# Patient Record
Sex: FEMALE
[De-identification: names, ages, dates, MRNs, and addresses within clinical notes are randomized per-mention and may not be internally consistent; named-entity substitution may affect disease eponyms.]

---

## 2023-04-27 PROBLEM — Z00.00 ENCOUNTER FOR PREVENTIVE HEALTH EXAMINATION: Status: ACTIVE | Noted: 2023-04-27

## 2023-09-27 ENCOUNTER — APPOINTMENT (OUTPATIENT)
Dept: PLASTIC SURGERY | Facility: CLINIC | Age: 33
End: 2023-09-27
Payer: COMMERCIAL

## 2023-09-27 VITALS
HEART RATE: 91 BPM | HEIGHT: 72 IN | TEMPERATURE: 97.3 F | BODY MASS INDEX: 33.86 KG/M2 | DIASTOLIC BLOOD PRESSURE: 89 MMHG | WEIGHT: 250 LBS | SYSTOLIC BLOOD PRESSURE: 136 MMHG

## 2023-09-27 DIAGNOSIS — B36.0 PITYRIASIS VERSICOLOR: ICD-10-CM

## 2023-09-27 PROCEDURE — 99204 OFFICE O/P NEW MOD 45 MIN: CPT

## 2023-09-29 ENCOUNTER — APPOINTMENT (OUTPATIENT)
Dept: PLASTIC SURGERY | Facility: CLINIC | Age: 33
End: 2023-09-29

## 2023-09-29 PROBLEM — B36.0 TINEA VERSICOLOR: Status: ACTIVE | Noted: 2023-09-29

## 2023-09-29 RX ORDER — ESTRADIOL 10 UG/1
TABLET, FILM COATED VAGINAL
Refills: 0 | Status: ACTIVE | COMMUNITY

## 2023-09-29 RX ORDER — PROGESTERONE 200 MG/1
CAPSULE ORAL
Refills: 0 | Status: ACTIVE | COMMUNITY

## 2023-10-11 ENCOUNTER — TRANSCRIPTION ENCOUNTER (OUTPATIENT)
Age: 33
End: 2023-10-11

## 2023-10-13 ENCOUNTER — APPOINTMENT (OUTPATIENT)
Dept: PLASTIC SURGERY | Facility: CLINIC | Age: 33
End: 2023-10-13
Payer: COMMERCIAL

## 2023-10-13 VITALS
DIASTOLIC BLOOD PRESSURE: 81 MMHG | WEIGHT: 250 LBS | TEMPERATURE: 98 F | SYSTOLIC BLOOD PRESSURE: 131 MMHG | HEIGHT: 72 IN | HEART RATE: 78 BPM | BODY MASS INDEX: 33.86 KG/M2 | OXYGEN SATURATION: 100 %

## 2023-10-13 DIAGNOSIS — F64.0 TRANSSEXUALISM: ICD-10-CM

## 2023-10-13 PROCEDURE — 99214 OFFICE O/P EST MOD 30 MIN: CPT

## 2023-10-14 PROBLEM — F64.0 GENDER DYSPHORIA IN ADULT: Status: ACTIVE | Noted: 2023-09-29

## 2024-06-07 ENCOUNTER — APPOINTMENT (OUTPATIENT)
Dept: CT IMAGING | Facility: CLINIC | Age: 34
End: 2024-06-07
Payer: MEDICAID

## 2024-06-07 ENCOUNTER — OUTPATIENT (OUTPATIENT)
Dept: OUTPATIENT SERVICES | Facility: HOSPITAL | Age: 34
LOS: 1 days | End: 2024-06-07

## 2024-06-07 PROCEDURE — 70486 CT MAXILLOFACIAL W/O DYE: CPT | Mod: 26

## 2024-10-25 ENCOUNTER — APPOINTMENT (OUTPATIENT)
Dept: PLASTIC SURGERY | Facility: CLINIC | Age: 34
End: 2024-10-25
Payer: COMMERCIAL

## 2024-10-25 VITALS
WEIGHT: 262 LBS | HEIGHT: 72 IN | TEMPERATURE: 98 F | SYSTOLIC BLOOD PRESSURE: 123 MMHG | HEART RATE: 72 BPM | BODY MASS INDEX: 35.49 KG/M2 | OXYGEN SATURATION: 98 % | DIASTOLIC BLOOD PRESSURE: 86 MMHG

## 2024-10-25 DIAGNOSIS — F64.0 TRANSSEXUALISM: ICD-10-CM

## 2024-10-25 PROCEDURE — 99215 OFFICE O/P EST HI 40 MIN: CPT

## 2024-10-30 VITALS
WEIGHT: 277.78 LBS | HEART RATE: 73 BPM | OXYGEN SATURATION: 98 % | RESPIRATION RATE: 16 BRPM | HEIGHT: 72 IN | SYSTOLIC BLOOD PRESSURE: 122 MMHG | TEMPERATURE: 98 F | DIASTOLIC BLOOD PRESSURE: 81 MMHG

## 2024-10-30 RX ORDER — ESTRADIOL 1.25 MG/1.25G
0.3 GEL TOPICAL
Refills: 0 | DISCHARGE

## 2024-10-31 ENCOUNTER — TRANSCRIPTION ENCOUNTER (OUTPATIENT)
Age: 34
End: 2024-10-31

## 2024-10-31 ENCOUNTER — INPATIENT (INPATIENT)
Facility: HOSPITAL | Age: 34
LOS: 1 days | Discharge: HOME | End: 2024-11-02
Attending: SURGERY | Admitting: SURGERY
Payer: MEDICAID

## 2024-10-31 ENCOUNTER — APPOINTMENT (OUTPATIENT)
Dept: PLASTIC SURGERY | Facility: HOSPITAL | Age: 34
End: 2024-10-31

## 2024-10-31 DIAGNOSIS — Z98.890 OTHER SPECIFIED POSTPROCEDURAL STATES: Chronic | ICD-10-CM

## 2024-10-31 DIAGNOSIS — F64.0 TRANSSEXUALISM: ICD-10-CM

## 2024-10-31 DIAGNOSIS — Z41.9 ENCOUNTER FOR PROCEDURE FOR PURPOSES OTHER THAN REMEDYING HEALTH STATE, UNSPECIFIED: Chronic | ICD-10-CM

## 2024-10-31 PROCEDURE — 21256 RECONSTRUCTION OF ORBIT: CPT

## 2024-10-31 PROCEDURE — 15824 RHYTIDECTOMY FOREHEAD: CPT

## 2024-10-31 PROCEDURE — 30420 RECONSTRUCTION OF NOSE: CPT

## 2024-10-31 PROCEDURE — 21209 REDUCTION OF FACIAL BONES: CPT

## 2024-10-31 PROCEDURE — 21139 RDCTJ FOREHEAD CNTRG&SETBACK: CPT

## 2024-10-31 PROCEDURE — 40799 UNLISTED PROCEDURE LIPS: CPT

## 2024-10-31 PROCEDURE — 20912 REMOVE CARTILAGE FOR GRAFT: CPT

## 2024-10-31 PROCEDURE — 21122 GENIOP SLDG OSTEOT 2/>: CPT

## 2024-10-31 PROCEDURE — 31899 UNLISTED PX TRACHEA BRONCHI: CPT

## 2024-10-31 PROCEDURE — 21270 AUGMENTATION CHEEK BONE: CPT | Mod: 50

## 2024-10-31 DEVICE — IMP IPS MAND/ORTHOG LP TI-6AL-4V 67MM: Type: IMPLANTABLE DEVICE | Status: FUNCTIONAL

## 2024-10-31 DEVICE — SCREW MAXDRIVE 1 LVL 2X7MM: Type: IMPLANTABLE DEVICE | Status: FUNCTIONAL

## 2024-10-31 DEVICE — SCREW 2.0X13MM: Type: IMPLANTABLE DEVICE | Status: FUNCTIONAL

## 2024-10-31 DEVICE — SCREW CR DRV 2.0X11MM: Type: IMPLANTABLE DEVICE | Status: FUNCTIONAL

## 2024-10-31 DEVICE — SURGCEL 4 X 8": Type: IMPLANTABLE DEVICE | Status: FUNCTIONAL

## 2024-10-31 DEVICE — MESH RESORB RXG 51X51MM 0.6MM: Type: IMPLANTABLE DEVICE | Status: FUNCTIONAL

## 2024-10-31 DEVICE — IMP IPS MARKING GUIDE TRANSFORM LP XS: Type: IMPLANTABLE DEVICE | Status: FUNCTIONAL

## 2024-10-31 DEVICE — IMP MALAR MIDFACE PEEK LP 27MM: Type: IMPLANTABLE DEVICE | Status: FUNCTIONAL

## 2024-10-31 DEVICE — SCREW BONE RESORB 2.1X4MM SONIC PIN: Type: IMPLANTABLE DEVICE | Status: FUNCTIONAL

## 2024-10-31 DEVICE — PIN SONIC RX 2.1X4MM: Type: IMPLANTABLE DEVICE | Status: FUNCTIONAL

## 2024-10-31 DEVICE — IMP IPS MARKING GUIDE CRANIAL TRANSFORM EXTRA LP: Type: IMPLANTABLE DEVICE | Status: FUNCTIONAL

## 2024-10-31 DEVICE — IMPLANTABLE DEVICE: Type: IMPLANTABLE DEVICE | Status: FUNCTIONAL

## 2024-10-31 DEVICE — SCREW EMERG CROSS DRIVE TI 2X9: Type: IMPLANTABLE DEVICE | Status: FUNCTIONAL

## 2024-10-31 RX ORDER — PROGESTERONE 200 MG/1
200 CAPSULE ORAL
Refills: 0 | DISCHARGE

## 2024-10-31 RX ORDER — EMTRICITABINE AND TENOFOVIR DISOPROXIL FUMARATE 200; 300 MG/1; MG/1
1 TABLET, FILM COATED ORAL
Refills: 0 | DISCHARGE

## 2024-10-31 RX ORDER — GABAPENTIN 300 MG/1
300 CAPSULE ORAL EVERY 12 HOURS
Refills: 0 | Status: DISCONTINUED | OUTPATIENT
Start: 2024-10-31 | End: 2024-11-02

## 2024-10-31 RX ORDER — ONDANSETRON HYDROCHLORIDE 2 MG/ML
4 INJECTION, SOLUTION INTRAMUSCULAR; INTRAVENOUS ONCE
Refills: 0 | Status: DISCONTINUED | OUTPATIENT
Start: 2024-10-31 | End: 2024-11-02

## 2024-10-31 RX ORDER — ACETAMINOPHEN 500 MG
1000 TABLET ORAL EVERY 6 HOURS
Refills: 0 | Status: COMPLETED | OUTPATIENT
Start: 2024-10-31 | End: 2024-10-31

## 2024-10-31 RX ORDER — DEXAMETHASONE 1.5 MG 1.5 MG/1
8 TABLET ORAL EVERY 12 HOURS
Refills: 0 | Status: DISCONTINUED | OUTPATIENT
Start: 2024-10-31 | End: 2024-11-02

## 2024-10-31 RX ORDER — IBUPROFEN 200 MG
600 TABLET ORAL EVERY 6 HOURS
Refills: 0 | Status: DISCONTINUED | OUTPATIENT
Start: 2024-10-31 | End: 2024-11-02

## 2024-10-31 RX ORDER — ACETAMINOPHEN 500 MG
975 TABLET ORAL EVERY 6 HOURS
Refills: 0 | Status: DISCONTINUED | OUTPATIENT
Start: 2024-10-31 | End: 2024-10-31

## 2024-10-31 RX ORDER — ACETAMINOPHEN 500 MG
1000 TABLET ORAL EVERY 6 HOURS
Refills: 0 | Status: COMPLETED | OUTPATIENT
Start: 2024-10-31 | End: 2024-11-01

## 2024-10-31 RX ORDER — FERROUS SULFATE 325(65) MG
325 TABLET ORAL
Refills: 0 | DISCHARGE

## 2024-10-31 RX ORDER — CEFAZOLIN SODIUM 1 G
2000 VIAL (EA) INJECTION EVERY 8 HOURS
Refills: 0 | Status: DISCONTINUED | OUTPATIENT
Start: 2024-10-31 | End: 2024-11-02

## 2024-10-31 RX ORDER — CHLORHEXIDINE GLUCONATE 40 MG/ML
15 SOLUTION TOPICAL
Refills: 0 | Status: DISCONTINUED | OUTPATIENT
Start: 2024-10-31 | End: 2024-11-02

## 2024-10-31 RX ADMIN — Medication 100 MILLIGRAM(S): at 21:36

## 2024-10-31 RX ADMIN — DEXAMETHASONE 1.5 MG 101.6 MILLIGRAM(S): 1.5 TABLET ORAL at 19:23

## 2024-10-31 RX ADMIN — Medication 75 MILLILITER(S): at 18:32

## 2024-10-31 RX ADMIN — Medication 400 MILLIGRAM(S): at 18:32

## 2024-10-31 NOTE — BRIEF OPERATIVE NOTE - BRIEF OP NOTE DRAINS
Two:  1) VARGAS drain at right side of submental incisions  2) VARGAS drain at right temporal scalp at hairline incision

## 2024-10-31 NOTE — BRIEF OPERATIVE NOTE - SECOND ASSIST PARTICIPATION DETAILS - (COMPONENTS OF THE PROCEDURE THAT ASSISTANT PARTICIPATED IN)
orbital rim repair, osseous genioplasty, lip augmentation, brow contouring, rhinoplasty, malarplasty Brow closure, osseous genioplasty, mandibular angle reduction, cheek implants.

## 2024-10-31 NOTE — PRE-ANESTHESIA EVALUATION ADULT - NSANTHOSAYNRD_GEN_A_CORE
No. BRII screening performed.  STOP BANG Legend: 0-2 = LOW Risk; 3-4 = INTERMEDIATE Risk; 5-8 = HIGH Risk

## 2024-10-31 NOTE — BRIEF OPERATIVE NOTE - FIRST ASSIST PARTICIPATION DETAILS - (COMPONENTS OF THE PROCEDURE THAT ASSISTANT PARTICIPATED IN)
Frontal sinus setback, tracheal shave and platysmaplasty, browlift, malarplasty, osseous genioplasty

## 2024-10-31 NOTE — BRIEF OPERATIVE NOTE - NSICDXBRIEFPROCEDURE_GEN_ALL_CORE_FT
PROCEDURES:  Contouring, forehead, anterior frontal sinus wall, for reduction and setback 31-Oct-2024 07:36:07  Catarina Desai  Reconstruction, orbit 31-Oct-2024 07:37:48  Catarina Desai  Browlift 31-Oct-2024 12:50:15  Catarina Desai  Repair, orbital rim 31-Oct-2024 12:50:42  Catarina Desai  Genioplasty with multiple sliding osteotomies 31-Oct-2024 12:51:24  Catarina Desai  Reduction, chin 31-Oct-2024 12:51:53  Catarina Desai  Rhinoseptoplasty 31-Oct-2024 12:52:20  Catarina Desai  Nasal septum cartilage graft 31-Oct-2024 12:53:35  Catarina Desai  Release of trachea 31-Oct-2024 12:54:13  Catarina Desai  Lip lift 31-Oct-2024 12:54:40  Catarina Desai  Malarplasty 31-Oct-2024 12:55:29  Catarina Desai

## 2024-11-01 ENCOUNTER — TRANSCRIPTION ENCOUNTER (OUTPATIENT)
Age: 34
End: 2024-11-01

## 2024-11-01 RX ORDER — IBUPROFEN 200 MG
1 TABLET ORAL
Qty: 28 | Refills: 0
Start: 2024-11-01

## 2024-11-01 RX ORDER — CHLORHEXIDINE GLUCONATE 40 MG/ML
15 SOLUTION TOPICAL
Qty: 1 | Refills: 0
Start: 2024-11-01 | End: 2024-11-07

## 2024-11-01 RX ORDER — OXYCODONE HYDROCHLORIDE 30 MG/1
1 TABLET ORAL
Qty: 10 | Refills: 0
Start: 2024-11-01

## 2024-11-01 RX ORDER — GABAPENTIN 300 MG/1
1 CAPSULE ORAL
Qty: 10 | Refills: 0
Start: 2024-11-01

## 2024-11-01 RX ADMIN — Medication 400 MILLIGRAM(S): at 06:14

## 2024-11-01 RX ADMIN — Medication 75 MILLILITER(S): at 07:22

## 2024-11-01 RX ADMIN — Medication 100 MILLIGRAM(S): at 22:38

## 2024-11-01 RX ADMIN — Medication 600 MILLIGRAM(S): at 09:09

## 2024-11-01 RX ADMIN — Medication 400 MILLIGRAM(S): at 11:11

## 2024-11-01 RX ADMIN — Medication 1000 MILLIGRAM(S): at 11:26

## 2024-11-01 RX ADMIN — Medication 100 MILLIGRAM(S): at 05:35

## 2024-11-01 RX ADMIN — Medication 75 MILLILITER(S): at 18:09

## 2024-11-01 RX ADMIN — DEXAMETHASONE 1.5 MG 101.6 MILLIGRAM(S): 1.5 TABLET ORAL at 18:08

## 2024-11-01 RX ADMIN — Medication 400 MILLIGRAM(S): at 00:11

## 2024-11-01 RX ADMIN — CHLORHEXIDINE GLUCONATE 15 MILLILITER(S): 40 SOLUTION TOPICAL at 09:09

## 2024-11-01 RX ADMIN — Medication 600 MILLIGRAM(S): at 14:36

## 2024-11-01 RX ADMIN — Medication 600 MILLIGRAM(S): at 15:36

## 2024-11-01 RX ADMIN — DEXAMETHASONE 1.5 MG 101.6 MILLIGRAM(S): 1.5 TABLET ORAL at 07:22

## 2024-11-01 RX ADMIN — Medication 600 MILLIGRAM(S): at 03:03

## 2024-11-01 RX ADMIN — Medication 100 MILLIGRAM(S): at 13:10

## 2024-11-01 RX ADMIN — CHLORHEXIDINE GLUCONATE 15 MILLILITER(S): 40 SOLUTION TOPICAL at 22:32

## 2024-11-01 RX ADMIN — Medication 600 MILLIGRAM(S): at 22:32

## 2024-11-01 NOTE — DISCHARGE NOTE PROVIDER - NSDCFUADDINST_GEN_ALL_CORE_FT
MEDICATIONS:  >> A prescription for pain medication was e-prescribed to your pharmacy. Please take this as needed for severe pain.   >> Do not drive while taking the prescribed pain medication  >> Do not take the pain medication on an empty stomach, this may make you nauseous  >> Constipation is not uncommon when taking prescription pain medication. You may take an over the counter stool softener like Dulcolax or Sennakot to help with this.   >> You may take over the counter pain medication such as tylenol (acetaminophen) or Advil (ibuprofen) for pain.   >> Please use peridex rinse - swish and spit twice daily for 7 days.       ACTIVITY:  >> No bending or heavy lifting. Keep your head above the level of your heart. At your first post-op visit we will assess how you are doing and will adjust the restrictions as needed.   >> Sleep with head elevated on at least 2 pillows.     DIET:  >> Intraoral incisions: Clear liquid diet for first 48 hours then may advance to full liquid diet for 7 days. Then advance to soft diet. Avoid overly hot, cold, spicy, or acidic foods.   >> Do not drink alcohol in the immediate postoperative period and while taking prescription pain medication.     WOUND CARE:  >> Gently brush teeth and keep mouth clean after eating. At your first postop visit we will assess the wound and instruct you of any care needed.   >> Do not get your face wet, but you can wash your hair and the rest of your body. If you have a nasal splint - DO NOT GET THIS WET.   >> You may gently apply ice packs, or frozen peas to the nose and eye regions. Apply this for the first 48-72 hours. 20 minutes on and 20 minutes off. If you have a nasal splint, DO NOT apply ice to your nose.   >> Keep nasal splint on until your next postop visit, do not get this wet. Change gauze under nose as needed.  Nose will bleed over the next day or two.  This is normal.  It will be a mix of blood and mucus.  If you are noticing bleeding that does not stop or is saturating more than 2-3 gauzes per hour please call our office.   >> If you were given a facial/chin garment (Jaw Bra), please wear this 24/7 until your first postop visit.      Contact us with any questions or concerns. Please follow up with Dr. Munroe within 1 week of discharge from the hospital. You may call [phone] to schedule an appointment.

## 2024-11-01 NOTE — DISCHARGE NOTE PROVIDER - NSDCMRMEDTOKEN_GEN_ALL_CORE_FT
estradiol valerate 40 mg/mL intramuscular solution: 0.3 milliliter(s) intramuscularly once a week  ferrous sulfate: 325 milligram(s) orally 3 times a week  progesterone 200 mg oral capsule: 200 milligram(s) orally once a day  Truvada 200 mg-300 mg oral tablet: 1 tab(s) orally once a day   estradiol valerate 40 mg/mL intramuscular solution: 0.3 milliliter(s) intramuscularly once a week  ferrous sulfate: 325 milligram(s) orally 3 times a week  gabapentin 300 mg oral capsule: 1 cap(s) orally every 12 hours  ibuprofen 600 mg oral tablet: 1 tab(s) orally every 6 hours  oxyCODONE 5 mg oral tablet: 1 tab(s) orally every 6 hours as needed for  severe pain MDD: 4 tabs  Peridex 0.12% mucous membrane liquid: 15 milliliter(s) orally every 12 hours MDD: 30 mL  progesterone 200 mg oral capsule: 200 milligram(s) orally once a day  Truvada 200 mg-300 mg oral tablet: 1 tab(s) orally once a day

## 2024-11-01 NOTE — DISCHARGE NOTE PROVIDER - CARE PROVIDER_API CALL
James Munroe  Plastic Surgery  [address], [address]  [place] [address]  Phone: [phone]  Fax: [phone]  Follow Up Time:

## 2024-11-01 NOTE — DISCHARGE NOTE PROVIDER - HOSPITAL COURSE
The patient underwent a facial feminization procedure. Postoperatively the patient was sent to PACU, the patient was hemodynamically stable and sent to a surgical floor. Drains were place intraoperatively, which were removed before discharge. The patients pain was controlled by IV pain medications transitioned to PO regimen. The patient was advanced to a full liquid diet and tolerated it well. The patient was hemodynamically stable was resumed home medications, told to follow up with Dr. Munroe in 1 week and had no other issues.

## 2024-11-01 NOTE — PROVIDER CONTACT NOTE (OTHER) - ASSESSMENT
Pt is alert and oriented. 139/63, 56 bpm and 99% on room air. Denies chest pain, SOB and resting in bed. Pre-op EKG shows NSR.

## 2024-11-01 NOTE — PROVIDER CONTACT NOTE (OTHER) - SITUATION
On monitor pt is alternating between normal sinus rhythm/bradycardia and sinus arrythmia per tele tech. When in sinus arrythmia, the rate is in line with pt respirations.

## 2024-11-01 NOTE — PROGRESS NOTE ADULT - SUBJECTIVE AND OBJECTIVE BOX
STATUS POST:  Contouring, forehead, anterior frontal sinus wall, for reduction and setback    Reconstruction, orbit    Browlift    Repair, orbital rim    Genioplasty with multiple sliding osteotomies    Reduction, chin    Rhinoseptoplasty    Nasal septum cartilage graft    Release of trachea    Lip lift    Malarplasty      POST OPERATIVE DAY #1    SUBJECTIVE: Patient seen and examined at bedside by surgical team on AM rounds. Patient is doing well, no complaints overnight. Pain is controlled, no n/v.     Vital Signs Last 24 Hrs  T(C): 37.4 (01 Nov 2024 04:18), Max: 37.4 (01 Nov 2024 04:18)  T(F): 99.4 (01 Nov 2024 04:18), Max: 99.4 (01 Nov 2024 04:18)  HR: 58 (01 Nov 2024 05:05) (52 - 80)  BP: 139/63 (01 Nov 2024 04:18) (115/73 - 141/82)  BP(mean): 89 (31 Oct 2024 19:45) (89 - 104)  RR: 17 (01 Nov 2024 04:18) (16 - 20)  SpO2: 99% (01 Nov 2024 04:18) (98% - 100%)    Parameters below as of 01 Nov 2024 04:18  Patient On (Oxygen Delivery Method): room air        I&O's Summary    31 Oct 2024 07:01  -  01 Nov 2024 06:47  --------------------------------------------------------  IN: 1055 mL / OUT: 2010 mL / NET: -955 mL        Physical Exam:  General Appearance: Appears well, NAD  HEENT: Dressing and jaw bra in place, JPx2  Pulmonary: Nonlabored breathing, no respiratory distress  Extremities: WWP, SCD's in place  STATUS POST:  Contouring, forehead, anterior frontal sinus wall, for reduction and setback    Reconstruction, orbit    Browlift    Repair, orbital rim    Genioplasty with multiple sliding osteotomies    Reduction, chin    Rhinoseptoplasty    Nasal septum cartilage graft    Release of trachea    Lip lift    Malarplasty      POST OPERATIVE DAY #1    SUBJECTIVE: Patient seen and examined at bedside by surgical team on AM rounds. Overall recovering well, pain is well controlled, no n/v, vitals WNL, plan for continued monitoring, likely d/c tomorrow.     Vital Signs Last 24 Hrs  T(C): 37.4 (01 Nov 2024 04:18), Max: 37.4 (01 Nov 2024 04:18)  T(F): 99.4 (01 Nov 2024 04:18), Max: 99.4 (01 Nov 2024 04:18)  HR: 58 (01 Nov 2024 05:05) (52 - 80)  BP: 139/63 (01 Nov 2024 04:18) (115/73 - 141/82)  BP(mean): 89 (31 Oct 2024 19:45) (89 - 104)  RR: 17 (01 Nov 2024 04:18) (16 - 20)  SpO2: 99% (01 Nov 2024 04:18) (98% - 100%)    Parameters below as of 01 Nov 2024 04:18  Patient On (Oxygen Delivery Method): room air        I&O's Summary    31 Oct 2024 07:01  -  01 Nov 2024 06:47  --------------------------------------------------------  IN: 1055 mL / OUT: 2010 mL / NET: -955 mL        Physical Exam:  General Appearance: Appears well, NAD  HEENT: Dressing and jaw bra in place, JPx2  Pulmonary: Nonlabored breathing, no respiratory distress  Extremities: WWP, SCD's in place

## 2024-11-01 NOTE — DISCHARGE NOTE PROVIDER - NSDCFUSCHEDAPPT_GEN_ALL_CORE_FT
James Munroe  [facility] Physician [facility]  [facility] [address] [place] [address]  Scheduled Appointment: 11/08/2024

## 2024-11-01 NOTE — DISCHARGE NOTE PROVIDER - NSDCCPTREATMENT_GEN_ALL_CORE_FT
PRINCIPAL PROCEDURE  Procedure: Contouring, forehead, anterior frontal sinus wall, for reduction and setback  Findings and Treatment:       SECONDARY PROCEDURE  Procedure: Browlift  Findings and Treatment:     Procedure: Repair, orbital rim  Findings and Treatment:     Procedure: Genioplasty with multiple sliding osteotomies  Findings and Treatment:     Procedure: Reconstruction, orbit  Findings and Treatment:

## 2024-11-01 NOTE — PROGRESS NOTE ADULT - ASSESSMENT
34 year old PMH of gender dysphoria now POD s/1 FFS with Dr. Munroe.  34F POD 1 s/p FFS without complications, overall recovering well, pain is well controlled, vitals WNL, plan for continued monitoring, likely d/c tomorrow.     -PRN pain/nausea control  -CLD  -Lavonne-op Ancef  -VARGAS/VARGAS Care  -Likely d/c home tomorrow

## 2024-11-02 ENCOUNTER — TRANSCRIPTION ENCOUNTER (OUTPATIENT)
Age: 34
End: 2024-11-02

## 2024-11-02 VITALS
TEMPERATURE: 98 F | RESPIRATION RATE: 17 BRPM | SYSTOLIC BLOOD PRESSURE: 130 MMHG | HEART RATE: 60 BPM | OXYGEN SATURATION: 96 % | DIASTOLIC BLOOD PRESSURE: 67 MMHG

## 2024-11-02 PROCEDURE — C9143: CPT

## 2024-11-02 PROCEDURE — C1713: CPT

## 2024-11-02 PROCEDURE — C1889: CPT

## 2024-11-02 RX ADMIN — Medication 600 MILLIGRAM(S): at 10:00

## 2024-11-02 RX ADMIN — Medication 100 MILLIGRAM(S): at 05:51

## 2024-11-02 RX ADMIN — Medication 600 MILLIGRAM(S): at 09:04

## 2024-11-02 RX ADMIN — CHLORHEXIDINE GLUCONATE 15 MILLILITER(S): 40 SOLUTION TOPICAL at 09:04

## 2024-11-02 RX ADMIN — DEXAMETHASONE 1.5 MG 101.6 MILLIGRAM(S): 1.5 TABLET ORAL at 05:19

## 2024-11-02 RX ADMIN — Medication 600 MILLIGRAM(S): at 03:30

## 2024-11-02 NOTE — DISCHARGE NOTE NURSING/CASE MANAGEMENT/SOCIAL WORK - PATIENT PORTAL LINK FT
You can access the FollowMyHealth Patient Portal offered by [facility] by registering at the following website: [email or web address]. By joining [employer]’s FollowMyHealth portal, you will also be able to view your health information using other applications (apps) compatible with our system.

## 2024-11-02 NOTE — PROGRESS NOTE ADULT - ASSESSMENT
34F POD 2 s/p FFS without complications, overall recovering well, pain is well controlled, vitals WNL, plan for continued monitoring. Discharge home.    -PRN pain/nausea control  -CLD  -Lavonne-op Ancef  -Discharge home

## 2024-11-02 NOTE — DISCHARGE NOTE NURSING/CASE MANAGEMENT/SOCIAL WORK - FINANCIAL ASSISTANCE
[facility] provides services at a reduced cost to those who are determined to be eligible through [facility]’s financial assistance program. Information regarding [facility]’s financial assistance program can be found by going to [email or web address] or by calling [phone].

## 2024-11-02 NOTE — PROGRESS NOTE ADULT - SUBJECTIVE AND OBJECTIVE BOX
STATUS POST:  Contouring, forehead, anterior frontal sinus wall, for reduction and setback    Reconstruction, orbit    Browlift    Repair, orbital rim    Genioplasty with multiple sliding osteotomies    Reduction, chin    Rhinoseptoplasty    Nasal septum cartilage graft    Release of trachea    Lip lift    Malarplasty      POST OPERATIVE DAY #2    SUBJECTIVE: Patient seen and examined at bedside by surgical team on AM rounds. Overall recovering well, pain is well controlled, no n/v, vitals WNL, plan for continued monitoring. Discharge home today.     Vital Signs Last 24 Hrs  T(C): 36.7 (02 Nov 2024 04:45), Max: 37.7 (01 Nov 2024 12:00)  T(F): 98 (02 Nov 2024 04:45), Max: 99.8 (01 Nov 2024 12:00)  HR: 63 (02 Nov 2024 04:45) (59 - 74)  BP: 133/73 (02 Nov 2024 04:45) (109/57 - 143/87)  BP(mean): 77 (01 Nov 2024 12:00) (77 - 77)  RR: 17 (02 Nov 2024 04:45) (16 - 19)  SpO2: 96% (02 Nov 2024 04:45) (96% - 99%)    Parameters below as of 02 Nov 2024 04:45  Patient On (Oxygen Delivery Method): room air    I&O's Detail    01 Nov 2024 07:01  -  02 Nov 2024 07:00  --------------------------------------------------------  IN:    IV PiggyBack: 100 mL    IV PiggyBack: 50 mL    Lactated Ringers: 1575 mL  Total IN: 1725 mL    OUT:    Bulb (mL): 10 mL    Bulb (mL): 7 mL    Voided (mL): 2200 mL  Total OUT: 2217 mL    Total NET: -492 mL            Physical Exam:  General Appearance: Appears well, NAD  HEENT: Dressing and jaw bra in place, JPx2  Pulmonary: Nonlabored breathing, no respiratory distress  Extremities: WWP, SCD's in place

## 2024-11-08 ENCOUNTER — NON-APPOINTMENT (OUTPATIENT)
Age: 34
End: 2024-11-08

## 2024-11-08 ENCOUNTER — APPOINTMENT (OUTPATIENT)
Dept: PLASTIC SURGERY | Facility: CLINIC | Age: 34
End: 2024-11-08

## 2024-11-08 VITALS
TEMPERATURE: 98.1 F | WEIGHT: 262 LBS | DIASTOLIC BLOOD PRESSURE: 80 MMHG | HEART RATE: 98 BPM | HEIGHT: 72 IN | SYSTOLIC BLOOD PRESSURE: 121 MMHG | OXYGEN SATURATION: 100 % | BODY MASS INDEX: 35.49 KG/M2

## 2024-11-08 DIAGNOSIS — F64.0 TRANSSEXUALISM: ICD-10-CM

## 2024-11-08 DIAGNOSIS — F64.9 GENDER IDENTITY DISORDER, UNSPECIFIED: ICD-10-CM

## 2024-11-08 DIAGNOSIS — Z09 ENCOUNTER FOR FOLLOW-UP EXAMINATION AFTER COMPLETED TREATMENT FOR CONDITIONS OTHER THAN MALIGNANT NEOPLASM: ICD-10-CM

## 2024-11-08 PROCEDURE — 99024 POSTOP FOLLOW-UP VISIT: CPT

## 2024-11-13 PROBLEM — Z09 POSTOPERATIVE EXAMINATION: Status: ACTIVE | Noted: 2024-11-13

## 2024-12-06 ENCOUNTER — APPOINTMENT (OUTPATIENT)
Dept: PLASTIC SURGERY | Facility: CLINIC | Age: 34
End: 2024-12-06

## 2024-12-06 VITALS
HEIGHT: 72 IN | WEIGHT: 264 LBS | HEART RATE: 61 BPM | TEMPERATURE: 97.4 F | BODY MASS INDEX: 35.76 KG/M2 | DIASTOLIC BLOOD PRESSURE: 78 MMHG | SYSTOLIC BLOOD PRESSURE: 127 MMHG | OXYGEN SATURATION: 100 %

## 2024-12-06 DIAGNOSIS — F64.0 TRANSSEXUALISM: ICD-10-CM

## 2024-12-06 DIAGNOSIS — Z09 ENCOUNTER FOR FOLLOW-UP EXAMINATION AFTER COMPLETED TREATMENT FOR CONDITIONS OTHER THAN MALIGNANT NEOPLASM: ICD-10-CM

## (undated) DEVICE — NDL HYPO SAFE 25G X 1.5" (ORANGE)

## (undated) DEVICE — MEDICATION LABELS W MARKER

## (undated) DEVICE — SUT PROLENE 3-0 18" PS-1

## (undated) DEVICE — FOLEY TRAY 16FR 5CC LF UMETER CLOSED

## (undated) DEVICE — SUT SILK 2-0 30" PSL

## (undated) DEVICE — SUT CHROMIC 3-0 27" PS-2

## (undated) DEVICE — DRAIN BLAKE 10FR ROUND

## (undated) DEVICE — STAPLER SKIN PROXIMATE

## (undated) DEVICE — DRAPE 1/2 SHEET 40X57"

## (undated) DEVICE — TWIST DRILL 1.5MM DIA X 50MM W/NOTCH SGL USE ONLY

## (undated) DEVICE — GLV 7.5 PROTEXIS (WHITE)

## (undated) DEVICE — GOWN SMARTGOWN XL/XLONG

## (undated) DEVICE — DRSG CURITY GAUZE SPONGE 4 X 4" 12-PLY

## (undated) DEVICE — CATH NG SALEM SUMP 16FR

## (undated) DEVICE — ELCTR BOVIE TIP BLADE VALLEYLAB 6.5"

## (undated) DEVICE — DRSG MASTISOL

## (undated) DEVICE — BUR STRYKER EGG 4MM

## (undated) DEVICE — ILLUMINATOR MINI VERSALIGHT EXTENDED FRAZIER TIP

## (undated) DEVICE — Device

## (undated) DEVICE — VAGINAL PACKING 2"

## (undated) DEVICE — DRSG KERLIX ROLL 4.5"

## (undated) DEVICE — ROUTER TAPR 2.3MM BLU RED

## (undated) DEVICE — SUT ETHILON 6-0 18" P-3

## (undated) DEVICE — ELCTR GROUNDING PAD ADULT COVIDIEN

## (undated) DEVICE — PACK PLASTIC ORTHOGNATHIC

## (undated) DEVICE — SAW BLADE STRYKER RECIPROCATING 27MMX0.38MM

## (undated) DEVICE — DRSG AQUAPLAST BLANK BLUSH 3 X 3"

## (undated) DEVICE — RASP STRYKER LARGE TEAR CROSSCUT 14X7MM DISP

## (undated) DEVICE — SUT MONOCRYL 3-0 18" PS-2 UNDYED

## (undated) DEVICE — RAYTEC SPONGE 4X4 16PLY

## (undated) DEVICE — SUT PLAIN GUT FAST ABSORBING 5-0 PC-1

## (undated) DEVICE — GLV 6.5 PROTEXIS (WHITE)

## (undated) DEVICE — SUT MONOCRYL 5-0 18" P-3 UNDYED

## (undated) DEVICE — ELCTR HOLSTER ACCESSORY

## (undated) DEVICE — RUBBERBAND STRL LTX FR 200/CA 3X1/8IN

## (undated) DEVICE — ELCTR BOVIE PENCIL HANDPIECE ROCKER SWITCH 15FT

## (undated) DEVICE — AESCULAP SCALPFIX 10 CLIPS

## (undated) DEVICE — BLADE SURGICAL #15 CARBON

## (undated) DEVICE — SUT MONOCRYL 4-0 18" P-3

## (undated) DEVICE — ZIMMER DERMACARRIER SKIN GRAFT MESH 8"

## (undated) DEVICE — BIPOLAR FORCEP SYMMETRY BAYONET STR 8.25" X 1.5MM (BLUE)

## (undated) DEVICE — DRSG XEROFORM 5 X 9"

## (undated) DEVICE — SUT VICRYL PLUS 2-0 18" CP-2 UNDYED (POP-OFF)

## (undated) DEVICE — DRILL BIT KLS MARTIN TWIST STOP 1.6X40MM

## (undated) DEVICE — BATTERY PACK KLS MARTIN SINGLE USE

## (undated) DEVICE — DRSG STERISTRIPS 0.5 X 4"

## (undated) DEVICE — DRSG SPANDAGE TUBULAR ELASTIC RETAINER NET SIZE 10

## (undated) DEVICE — SUT ETHILON 5-0 18" PS-2

## (undated) DEVICE — ELCTR COLORADO 3CM

## (undated) DEVICE — SUT MONOCRYL 4-0 27" PS-2 UNDYED

## (undated) DEVICE — DEVICE TRANSFER DEVICE STER

## (undated) DEVICE — SUT VICYRL 3-0 18" PS-4C UNDYED

## (undated) DEVICE — ELCTR STRYKER NEPTUNE SMOKE EVACUATION PENCIL (GREEN)

## (undated) DEVICE — PRESSURE INFUSOR BAG 1000ML

## (undated) DEVICE — MODEL IPS SUPPLEMENTAL TRANSFORM HALF LP